# Patient Record
Sex: MALE | Race: WHITE | NOT HISPANIC OR LATINO | Employment: FULL TIME | URBAN - METROPOLITAN AREA
[De-identification: names, ages, dates, MRNs, and addresses within clinical notes are randomized per-mention and may not be internally consistent; named-entity substitution may affect disease eponyms.]

---

## 2021-05-28 ENCOUNTER — RECORDS - HEALTHEAST (OUTPATIENT)
Dept: ADMINISTRATIVE | Facility: CLINIC | Age: 41
End: 2021-05-28

## 2023-12-05 ENCOUNTER — OFFICE VISIT (OUTPATIENT)
Dept: FAMILY MEDICINE | Facility: CLINIC | Age: 43
End: 2023-12-05
Payer: COMMERCIAL

## 2023-12-05 ENCOUNTER — TELEPHONE (OUTPATIENT)
Dept: FAMILY MEDICINE | Facility: CLINIC | Age: 43
End: 2023-12-05
Payer: COMMERCIAL

## 2023-12-05 ENCOUNTER — ANCILLARY PROCEDURE (OUTPATIENT)
Dept: GENERAL RADIOLOGY | Facility: CLINIC | Age: 43
End: 2023-12-05
Attending: PHYSICIAN ASSISTANT
Payer: COMMERCIAL

## 2023-12-05 VITALS
OXYGEN SATURATION: 98 % | TEMPERATURE: 98.5 F | DIASTOLIC BLOOD PRESSURE: 84 MMHG | RESPIRATION RATE: 16 BRPM | HEART RATE: 91 BPM | SYSTOLIC BLOOD PRESSURE: 141 MMHG

## 2023-12-05 DIAGNOSIS — R29.898 XIPHOID PROMINENCE: ICD-10-CM

## 2023-12-05 DIAGNOSIS — R07.89 XIPHOID PAIN: Primary | ICD-10-CM

## 2023-12-05 PROCEDURE — 99203 OFFICE O/P NEW LOW 30 MIN: CPT | Performed by: PHYSICIAN ASSISTANT

## 2023-12-05 PROCEDURE — 71046 X-RAY EXAM CHEST 2 VIEWS: CPT | Mod: TC | Performed by: STUDENT IN AN ORGANIZED HEALTH CARE EDUCATION/TRAINING PROGRAM

## 2023-12-05 NOTE — PATIENT INSTRUCTIONS
Ice topically to the area 20 minutes on each hour while awake.  Take precautions to avoid damage to the skin.  After 2 days, transition to ice topically 20 minutes 3 times per day.  After 2 days may add heat and alternate ice and heat.  Ibuprofen 600 mg (3 tablets) 3 times a day with food for analgesia and anti-inflammatory effect.  Do not use the ibuprofen if you have contraindications to using NSAIDs.    Return to see primary care provider or return to clinic for reexamination and onofre-ray in 2 weeks if anything less than 100% resolution or return to pain-free weightbearing and full activities.

## 2023-12-05 NOTE — PROGRESS NOTES
Patient presents with:  Mass: Bump on chest feels sore       Clinical Decision Making:  Patient had concern for prominence of the xiphoid and had some pain at the xiphoid process.  No known precipitating event or injury.  X-ray did not show abnormality.  Symptomatic care with topical ice protection and use of ibuprofen for anti-inflammatory effect. Expected course of resolution and indication for return was gone over and questions were answered to patient/parent's satisfaction before discharge.        ICD-10-CM    1. Xiphoid pain  R07.89 XR Chest 2 Views      2. Xiphoid prominence  R29.898 XR Chest 2 Views          Patient Instructions   Ice topically to the area 20 minutes on each hour while awake.  Take precautions to avoid damage to the skin.  After 2 days, transition to ice topically 20 minutes 3 times per day.  After 2 days may add heat and alternate ice and heat.  Ibuprofen 600 mg (3 tablets) 3 times a day with food for analgesia and anti-inflammatory effect.  Do not use the ibuprofen if you have contraindications to using NSAIDs.    Return to see primary care provider or return to clinic for reexamination and onofre-ray in 2 weeks if anything less than 100% resolution or return to pain-free weightbearing and full activities.      HPI:  Isaak Holland is a 43 year old male who presents today for concern for tenderness and prominence of the xiphoid process.  Patient states over the last 1 week he has had some soreness and a bump on the chest.  Denies precipitating event or injury.  No runny nose cough fever or respiratory symptoms to report.  No treatment was tried for this at home.     History obtained from chart review and the patient.    Problem List:  There are no relevant problems documented for this patient.      History reviewed. No pertinent past medical history.    Social History     Tobacco Use    Smoking status: Not on file    Smokeless tobacco: Not on file   Substance Use Topics    Alcohol use: Not on  file       Review of Systems  As above in HPI otherwise negative.    Vitals:    12/05/23 1315   BP: (!) 141/84   Pulse: 91   Resp: 16   Temp: 98.5  F (36.9  C)   TempSrc: Oral   SpO2: 98%       General: Patient is resting comfortably no acute distress is afebrile  HEENT: Head is normocephalic atraumatic   eyes are PERRL EOMI sclera anicteric   Skin: Without rash non-diaphoretic  Musculoskeletal:  Patient had tenderness over the xiphoid process.  There is no crepitance or movement with no noted swelling bruising or erythema or warmth to touch.  This did produce patient's symptoms.    Physical Exam      Labs:  Results for orders placed or performed in visit on 12/05/23   XR Chest 2 Views     Status: None    Narrative    EXAM: XR CHEST 2 VIEWS  LOCATION: [facility]  DATE: 12/5/2023    INDICATION: xiphoid process pain  COMPARISON: None.      Impression    IMPRESSION: Lungs are clear. No pleural effusion or pneumothorax. Cardiac silhouette and mediastinal contours are normal. No displaced fractures.       Radiology:  I have personally ordered and preliminarily reviewed the following xray, I have noted no acute abnormality that showed on the manubrium or the sternum and xiphoid and no acute cardiopulmonary processes    At the end of the encounter, I discussed results, diagnosis, medications. Discussed red flags for immediate return to clinic/ER, as well as indications for follow up if no improvement. Patient understood and agreed to plan. Patient was stable for discharge.

## 2023-12-05 NOTE — TELEPHONE ENCOUNTER
Reason for Call:  Appointment Request    Patient requesting this type of appt:  bump on ribs that's sore 2 weeks     Requested provider:  Dr. Guajardo or any male other provider taking new pts     Reason patient unable to be scheduled: Not within requested timeframe    When does patient want to be seen/preferred time:  asap     Comments: n/a    Okay to leave a detailed message?: Yes at Cell number on file:    Telephone Information:   Mobile [phone]       Call taken on 12/5/2023 at 11:10 AM by Ariadna Newton